# Patient Record
Sex: FEMALE | Race: BLACK OR AFRICAN AMERICAN | NOT HISPANIC OR LATINO | Employment: OTHER | RURAL
[De-identification: names, ages, dates, MRNs, and addresses within clinical notes are randomized per-mention and may not be internally consistent; named-entity substitution may affect disease eponyms.]

---

## 2017-10-06 ENCOUNTER — OFFICE VISIT (OUTPATIENT)
Dept: PODIATRY | Facility: CLINIC | Age: 77
End: 2017-10-06

## 2017-10-06 VITALS — HEIGHT: 60 IN | BODY MASS INDEX: 37.3 KG/M2 | WEIGHT: 190 LBS

## 2017-10-06 DIAGNOSIS — M79.671 RIGHT FOOT PAIN: Primary | ICD-10-CM

## 2017-10-06 DIAGNOSIS — M67.471 GANGLION CYST OF RIGHT FOOT: ICD-10-CM

## 2017-10-06 PROCEDURE — 99203 OFFICE O/P NEW LOW 30 MIN: CPT | Performed by: PODIATRIST

## 2017-10-06 RX ORDER — CHLORTHALIDONE 25 MG/1
25 TABLET ORAL DAILY
COMMUNITY
Start: 2017-09-16

## 2017-10-06 RX ORDER — LOSARTAN POTASSIUM 100 MG/1
100 TABLET ORAL DAILY
COMMUNITY
Start: 2017-09-16 | End: 2023-02-16 | Stop reason: ALTCHOICE

## 2017-10-06 RX ORDER — MELOXICAM 15 MG/1
15 TABLET ORAL DAILY
COMMUNITY
Start: 2017-09-16

## 2017-10-06 RX ORDER — LEVOTHYROXINE SODIUM 0.05 MG/1
50 TABLET ORAL DAILY
COMMUNITY
Start: 2017-09-16

## 2017-10-06 RX ORDER — AMLODIPINE BESYLATE 10 MG/1
10 TABLET ORAL DAILY
COMMUNITY
Start: 2017-09-16

## 2017-10-06 RX ORDER — POTASSIUM CHLORIDE 750 MG/1
10 TABLET, FILM COATED, EXTENDED RELEASE ORAL DAILY
COMMUNITY
Start: 2017-09-16 | End: 2023-02-16 | Stop reason: ALTCHOICE

## 2017-10-06 NOTE — PROGRESS NOTES
"Libia Lindo  1940  77 y.o. female   Patient presents today for right foot pain.    10/6/2017  Chief Complaint   Patient presents with   • Right Foot - Pain           History of Present Illness    77-year-old female presents to clinic today with chief complaint of right foot pain.  Pain is located to the top of her right foot.  States that there is a painful nodule has been there for several months.  The nodule rubs in shoe gear.  The pain comes and goes.  She describes the pain as shooting when it happens.  She treats it by not wearing shoes that rub the area.  She has done nothing else to treat it.  She denies any injuries or trauma to the right foot.  She has no other pedal complaints.        Past Medical History:   Diagnosis Date   • Hypertension    • Hyperthyroidism          Past Surgical History:   Procedure Laterality Date   • EYE SURGERY     • HYSTERECTOMY     • KNEE SURGERY           No family history on file.    No Known Allergies    Social History     Social History   • Marital status:      Spouse name: N/A   • Number of children: N/A   • Years of education: N/A     Occupational History   • Not on file.     Social History Main Topics   • Smoking status: Never Smoker   • Smokeless tobacco: Not on file   • Alcohol use No   • Drug use: No   • Sexual activity: Defer     Other Topics Concern   • Not on file     Social History Narrative   • No narrative on file         Current Outpatient Prescriptions   Medication Sig Dispense Refill   • amLODIPine (NORVASC) 10 MG tablet 10 mg Daily.     • chlorthalidone (HYGROTON) 25 MG tablet 25 mg Daily.     • levothyroxine (SYNTHROID, LEVOTHROID) 50 MCG tablet 50 mcg Daily.     • losartan (COZAAR) 100 MG tablet 100 mg Daily.     • meloxicam (MOBIC) 15 MG tablet 15 mg Daily.     • potassium chloride (K-DUR) 10 MEQ CR tablet 10 mEq Daily.       No current facility-administered medications for this visit.          OBJECTIVE    Ht 60\" (152.4 cm)  Wt 190 " lb (86.2 kg)  BMI 37.11 kg/m2      Review of Systems   Constitutional: Negative for chills and fever.   Cardiovascular: Negative for chest pain.   Gastrointestinal: Negative for constipation, diarrhea, nausea and vomiting.   Skin: Negative for wound.  nodule top of right foot  Musculoskeletal:Right foot pain      Constitutional: well developed, well nourished    HEENT: Normocephalic and atraumatic, normal hearing    Respiratory: Non labored respirations noted    Cardiovascular:    DP/PT pulses palpable    CFT brisk  to all digits  Skin temp is warm to warm from proximal tibia to distal digits  Pedal hair growth present.   No erythema or edema noted     Musculoskeletal:  Muscle strength is 5/5 for all muscle groups tested   ROM of the 1st MTP is full without pain or crepitus  ROM of the MTJ is full without pain or crepitus    ROM of the STJ is full without pain or crepitus    ROM of the ankle joint is full without pain or crepitus    Rectus foot type     Dermatological:   Nails 1-5 are within normal limits for length and thickness    Skin is warm, dry and intact    Webspaces 1-4 bilateral are clean, dry and intact.   Soft freely mobile subcutaneous mass noted to the dorsal aspect the right foot.  It is not tender to palpation.  Positive Tinel's on percussion  No open wounds noted     Neurological:   Protective sensation intact    Sensation intact to light touch    DTR intact    Psychiatric: A&O x 3 with normal mood and affect. NAD.     Radiographs: 3 views of the right foot were obtained today.  No acute osseous abnormalities noted.        Procedures        ASSESSMENT AND PLAN    Libia was seen today for pain.    Diagnoses and all orders for this visit:    Right foot pain  -     XR Foot 3+ View Right    Ganglion cyst of right foot  -     MRI Foot Right With & Without Contrast; Future    - Comprehensive foot and ankle exam performed.   - X-rays taken and reviewed  - Ordered MRI to evaluate suspected ganglionic cyst of  right foot  - .  Discussion was held patient regarding treatment options for her daily on exist  - All questions were answered and the patient is in agreement with the current treatment plan.  - RTC after MRI          This document has been electronically signed by Leander Verdin DPM on October 6, 2017 3:21 PM     10/6/2017  3:21 PM    EMR Dragon/Transcription disclaimer:   Much of this encounter note is an electronic transcription/translation of spoken language to printed text. The electronic translation of spoken language may permit erroneous, or at times, nonsensical words or phrases to be inadvertently transcribed; Although I have reviewed the note for such errors, some may still exist.

## 2017-10-18 ENCOUNTER — HOSPITAL ENCOUNTER (OUTPATIENT)
Dept: MRI IMAGING | Facility: HOSPITAL | Age: 77
Discharge: HOME | End: 2017-10-18
Attending: PODIATRIST | Admitting: PODIATRIST

## 2017-10-18 DIAGNOSIS — M67.471 GANGLION CYST OF RIGHT FOOT: ICD-10-CM

## 2017-10-18 PROCEDURE — A9576 INJ PROHANCE MULTIPACK: HCPCS | Performed by: PODIATRIST

## 2017-10-18 PROCEDURE — 73720 MRI LWR EXTREMITY W/O&W/DYE: CPT

## 2017-10-18 PROCEDURE — 25010000002 GADOTERIDOL PER 1 ML: Performed by: PODIATRIST

## 2017-10-18 RX ADMIN — GADOTERIDOL 19 ML: 279.3 INJECTION, SOLUTION INTRAVENOUS at 09:19

## 2017-11-10 ENCOUNTER — TELEPHONE (OUTPATIENT)
Dept: PODIATRY | Facility: CLINIC | Age: 77
End: 2017-11-10

## 2023-02-16 ENCOUNTER — OFFICE VISIT (OUTPATIENT)
Dept: CARDIAC SURGERY | Facility: CLINIC | Age: 83
End: 2023-02-16
Payer: MEDICARE

## 2023-02-16 VITALS
OXYGEN SATURATION: 97 % | BODY MASS INDEX: 31.38 KG/M2 | HEIGHT: 61 IN | DIASTOLIC BLOOD PRESSURE: 80 MMHG | HEART RATE: 69 BPM | SYSTOLIC BLOOD PRESSURE: 128 MMHG | TEMPERATURE: 97.5 F | WEIGHT: 166.2 LBS

## 2023-02-16 DIAGNOSIS — I65.23 BILATERAL CAROTID ARTERY STENOSIS: Primary | ICD-10-CM

## 2023-02-16 DIAGNOSIS — I10 BENIGN ESSENTIAL HTN: ICD-10-CM

## 2023-02-16 DIAGNOSIS — E66.09 CLASS 1 OBESITY DUE TO EXCESS CALORIES WITH SERIOUS COMORBIDITY AND BODY MASS INDEX (BMI) OF 31.0 TO 31.9 IN ADULT: ICD-10-CM

## 2023-02-16 DIAGNOSIS — E78.2 MIXED HYPERLIPIDEMIA: ICD-10-CM

## 2023-02-16 DIAGNOSIS — G60.9 IDIOPATHIC PERIPHERAL NEUROPATHY: ICD-10-CM

## 2023-02-16 PROCEDURE — 1159F MED LIST DOCD IN RCRD: CPT | Performed by: THORACIC SURGERY (CARDIOTHORACIC VASCULAR SURGERY)

## 2023-02-16 PROCEDURE — 99204 OFFICE O/P NEW MOD 45 MIN: CPT | Performed by: THORACIC SURGERY (CARDIOTHORACIC VASCULAR SURGERY)

## 2023-02-16 PROCEDURE — 3079F DIAST BP 80-89 MM HG: CPT | Performed by: THORACIC SURGERY (CARDIOTHORACIC VASCULAR SURGERY)

## 2023-02-16 PROCEDURE — 3074F SYST BP LT 130 MM HG: CPT | Performed by: THORACIC SURGERY (CARDIOTHORACIC VASCULAR SURGERY)

## 2023-02-16 PROCEDURE — 1160F RVW MEDS BY RX/DR IN RCRD: CPT | Performed by: THORACIC SURGERY (CARDIOTHORACIC VASCULAR SURGERY)

## 2023-02-16 RX ORDER — ATORVASTATIN CALCIUM 40 MG/1
40 TABLET, FILM COATED ORAL DAILY
COMMUNITY

## 2023-02-16 RX ORDER — GABAPENTIN 100 MG/1
200 CAPSULE ORAL
COMMUNITY

## 2023-02-21 ENCOUNTER — TRANSCRIBE ORDERS (OUTPATIENT)
Dept: SPEECH THERAPY | Facility: HOSPITAL | Age: 83
End: 2023-02-21
Payer: MEDICARE

## 2023-02-21 DIAGNOSIS — R13.12 OROPHARYNGEAL DYSPHAGIA: Primary | ICD-10-CM

## 2023-03-20 ENCOUNTER — HOSPITAL ENCOUNTER (OUTPATIENT)
Dept: GENERAL RADIOLOGY | Facility: HOSPITAL | Age: 83
Discharge: HOME | End: 2023-03-20
Admitting: NURSE PRACTITIONER
Payer: MEDICARE

## 2023-03-20 DIAGNOSIS — R13.12 OROPHARYNGEAL DYSPHAGIA: ICD-10-CM

## 2023-03-20 PROCEDURE — A9270 NON-COVERED ITEM OR SERVICE: HCPCS | Performed by: NURSE PRACTITIONER

## 2023-03-20 PROCEDURE — 74230 X-RAY XM SWLNG FUNCJ C+: CPT

## 2023-03-20 PROCEDURE — 63710000001 BARIUM SULFATE 96 % RECONSTITUTED SUSPENSION: Performed by: NURSE PRACTITIONER

## 2023-03-20 PROCEDURE — 92611 MOTION FLUOROSCOPY/SWALLOW: CPT | Performed by: SPEECH-LANGUAGE PATHOLOGIST

## 2023-03-20 RX ADMIN — BARIUM SULFATE 25 ML: 960 POWDER, FOR SUSPENSION ORAL at 09:33

## 2023-03-20 NOTE — THERAPY EVALUATION
Speech Language Pathology   MBS FEES / Discharge Summary  [facility]       Patient Name: Libia Lindo  : 1940  MRN: [id number]    Today's Date: 3/20/2023      Visit Date: 2023     Visit Dx:     ICD-10-CM ICD-9-CM   1. Oropharyngeal dysphagia  R13.12 787.22       There is no problem list on file for this patient.       Past Medical History:   Diagnosis Date   • Hypertension    • Hyperthyroidism         Past Surgical History:   Procedure Laterality Date   • EYE SURGERY     • HYSTERECTOMY     • KNEE SURGERY                    OP SLP Assessment/Plan - 23 0910        SLP Assessment    Functional Problems Speech Language- Adult/Cognition;Swallowing  -EK    Impact on Function: Swallowing Risk of malnourishment  -EK    Clinical Impression: Swallowing Mild:;oral phase dysphagia  -EK    Functional Problems Comment globus  -EK    Clinical Impression Comments Pt and sister provided history and SLP educated both at end of MBS. Pt displays oral phase deficits to include decreased oral transit time, oral holding on liquids however pharyngeal phase of the swallow WFL. No residue noted in th pharynx, no penetration or aspiration noted.  -EK    Prognosis Good (comment)  -EK    Patient/caregiver participated in establishment of treatment plan and goals No  -EK       SLP Plan    Frequency Evaluation only  -EK          User Key  (r) = Recorded By, (t) = Taken By, (c) = Cosigned By    Initials Name Provider Type    EK Fany Cha CCC-SLP Speech and Language Pathologist                 SLP Adult Swallow Evaluation     Row Name 2310       Rehab Evaluation    Document Type evaluation  -EK    Patient Observations alert;cooperative;agree to therapy  -EK    Patient/Family/Caregiver Comments/Observations sister present  -EK    Patient Effort good  -EK       General Information    Patient Profile Reviewed yes  -EK    Pertinent History Of Current Problem Pt reports a lump in her  throat. Pt's voice is very dysphonia with hoarseness. Pt reports her voice comes and goes. Hoarseness and swallowing has been occuring for about one month. Pt has had a 20 pound weight loss. Sister does report that pt has thyroid nodules. Pt is seeing ENT tomorrow.  -EK    Current Method of Nutrition regular textures;thin liquids  -EK    Precautions/Limitations, Vision WFL with corrective lenses  -EK    Precautions/Limitations, Hearing WFL  -EK    Prior Level of Function-Communication WFL  -EK    Prior Level of Function-Swallowing no diet consistency restrictions  -EK    Plans/Goals Discussed with patient;family  sister  -EK       Pain    Additional Documentation Pain Scale: Numbers Pre/Post-Treatment (Group)  -EK       Pain Scale: Numbers Pre/Post-Treatment    Pretreatment Pain Rating 0/10 - no pain  -EK    Posttreatment Pain Rating 0/10 - no pain  -EK       Oral Motor Structure and Function    Dentition Assessment upper dentures/partial in place;lower dentures/partial in place  -EK    Secretion Management WNL/WFL  -EK    Volitional Swallow WFL  -EK    Volitional Cough WFL  -EK       MBS/VFSS    Utensils Used cup;spoon  -EK       MBS/VFSS Interpretation    Oral Prep Phase impaired oral phase of swallowing  -EK    Oral Transit Phase impaired  -EK    Oral Residue WFL  -EK       Oral Preparatory Phase    Oral Preparatory Phase prolonged manipulation  -EK       Oral Transit Phase    Impaired Oral Transit Phase increased A-P transit time;premature spillage of liquids into pharynx  -EK    Increased A-P Transit Time thin liquids;pudding/puree  -EK    Premature Spillage of Liquids into Pharynx thin liquids  -EK       Initiation of Pharyngeal Swallow    Pharyngeal Phase functional pharyngeal phase of swallowing  -EK       Esophageal Phase    Esophageal Phase --  unremarkable for viewing area  -EK       SLP Communication to Radiology    Severity Level of Dysphagia mild dysphagia;oral dysfunction  -EK    Consistencies  Aspirated/Penetrated --  No penetration or aspiration  -EK    Summary Statement MBS completed due to pt reporting globus for about one month. Pt also displays dysphonia to include hoarsenes and episodes of no voice.  -EK    Summary Statement Continued Pt and sister provided history and SLP educated both at end of MBS. Pt displays oral phase deficits to include decreased oral transit time, oral holding on liquids however pharyngeal phase of the swallow WFL. No residue noted in th pharynx, no penetration or aspiration noted.  -EK       SLP Evaluation Clinical Impression    SLP Swallowing Diagnosis mild;oral dysphagia;functional pharyngeal phase  -EK       SLP Treatment Clinical Impressions    Care Plan Review evaluation/treatment results reviewed  -EK       Recommendations    Therapy Frequency (Swallow) evaluation only  -EK    SLP Diet Recommendation regular textures;thin liquids  -EK    Recommended Precautions and Strategies upright posture during/after eating;small bites of food and sips of liquid;alternate between small bites of food and sips of liquid  -EK    Oral Care Recommendations Oral Care BID/PRN  -EK    SLP Rec. for Method of Medication Administration meds whole;with thin liquids;as tolerated  -EK    Monitor for Signs of Aspiration yes  -EK    Demonstrates Need for Referral to Another Service otolaryngology (ENT);gastroenterology  Pt may benefit from esophagram to rule out esophageal phase deficits. ENT schedule for tomorrow.  -EK          User Key  (r) = Recorded By, (t) = Taken By, (c) = Cosigned By    Initials Name Provider Type    Fany Delgado CCC-SLP Speech and Language Pathologist                                OP SLP Education     Row Name 03/20/23 2453       Education    Barriers to Learning No barriers identified  -EK    Education Provided Described results of evaluation  -EK    Assessed Learning motivation  -EK    Learning Motivation Strong  -EK    Learning Method  Explanation;Demonstration  -EK    Teaching Response Verbalized understanding;Demonstrated understanding  -EK    Education Comments SLP educated pt and sister on the results of MBS. SLP showed pt/family the images after the MBS to discuss oral phase defictis. Sister demonstrated understanding.  -EK          User Key  (r) = Recorded By, (t) = Taken By, (c) = Cosigned By    Initials Name Effective Dates    Fany Delgado CCC-SLP 06/16/21 -                                    Time Calculation:        Therapy Charges for Today     Code Description Service Date Service Provider Modifiers Qty    [id number] HC ST MOTION FLUORO EVAL SWALLOW 3 3/20/2023 Fany Cha CCC-SLP GN 1                  ANNITA Foote  3/20/2023

## 2023-03-28 PROBLEM — E66.09 CLASS 1 OBESITY DUE TO EXCESS CALORIES WITH SERIOUS COMORBIDITY AND BODY MASS INDEX (BMI) OF 31.0 TO 31.9 IN ADULT: Status: ACTIVE | Noted: 2023-03-28

## 2023-03-28 PROBLEM — E78.2 MIXED HYPERLIPIDEMIA: Status: ACTIVE | Noted: 2023-03-28

## 2023-03-28 PROBLEM — G60.9 IDIOPATHIC PERIPHERAL NEUROPATHY: Status: ACTIVE | Noted: 2023-03-28

## 2023-03-28 PROBLEM — I65.23 BILATERAL CAROTID ARTERY STENOSIS: Status: ACTIVE | Noted: 2023-03-28

## 2023-03-28 PROBLEM — I10 BENIGN ESSENTIAL HTN: Status: ACTIVE | Noted: 2023-03-28

## 2023-03-28 NOTE — PROGRESS NOTES
2/16/2023  Libia Lindo  1940    Chief Complaint:    Chief Complaint   Patient presents with   • pulsatile neck mass       HPI:      PCP:  Mahogany Rowe APRN    82 y.o. female with HTN(stable, increased risk stroke, rupture), Hyperlipidemia(stable, increased risk cardiovascular events) and Obesity(uncontrolled, increased risk cardiovascular events) , peripheral neuropathy(stable), carotid stenosis(new, increase risk stroke).  never smoked.  Asymptomatic carotid stenosis.   Evaluation 1/2023 [facility] neck mass, speech issues, something stuck in throat. No TIA stroke amaurosis.  No MI claudication. No other associated signs, symptoms or modifying factors.    1/2023 CTA Neck ([facility]):  RIGHT prominent tortuous innominate artery, subclavian 50% kink, tortuous.  No significant carotid stenosis or aneurysm.    1/2023 ECG:  SB 57, QTc 453.  Inferior infarct.    The following portions of the patient's history were reviewed and updated as appropriate: allergies, current medications, past family history, past medical history, past social history, past surgical history and problem list.  Recent images independently reviewed.  Available laboratory values reviewed.    PMH:  Past Medical History:   Diagnosis Date   • Hypertension    • Hyperthyroidism      Past Surgical History:   Procedure Laterality Date   • EYE SURGERY     • HYSTERECTOMY     • KNEE SURGERY       No family history on file.  Social History     Tobacco Use   • Smoking status: Never     Passive exposure: Never   • Smokeless tobacco: Never   Substance Use Topics   • Alcohol use: No   • Drug use: No       ALLERGIES:  No Known Allergies      MEDICATIONS:    Current Outpatient Medications:   •  amLODIPine (NORVASC) 10 MG tablet, 10 mg Daily., Disp: , Rfl:   •  atorvastatin (LIPITOR) 40 MG tablet, Take 40 mg by mouth Daily., Disp: , Rfl:   •  chlorthalidone (HYGROTON) 25 MG tablet, 25 mg Daily., Disp: , Rfl:   •  gabapentin (NEURONTIN) 100 MG capsule, Take  "200 mg by mouth every night at bedtime., Disp: , Rfl:   •  levothyroxine (SYNTHROID, LEVOTHROID) 50 MCG tablet, 50 mcg Daily., Disp: , Rfl:   •  linaclotide (LINZESS) 290 MCG capsule capsule, Take 290 mcg by mouth Every Morning Before Breakfast., Disp: , Rfl:   •  meloxicam (MOBIC) 15 MG tablet, 15 mg Daily., Disp: , Rfl:   •  vitamin D3 125 MCG (5000 UT) capsule capsule, Take 5,000 Units by mouth Daily., Disp: , Rfl:     Review of Systems   Review of Systems   Constitutional: Negative for malaise/fatigue and weight loss.   HENT:        Something stuck in throat  Speech issues  Pulsatile neck mass   Cardiovascular: Negative for chest pain, claudication and dyspnea on exertion.   Respiratory: Negative for cough and shortness of breath.    Skin: Negative for color change and poor wound healing.   Musculoskeletal: Positive for arthritis and joint pain.   Neurological: Negative for dizziness, numbness and weakness.       Physical Exam   Vitals:    02/16/23 1314 02/16/23 1315   BP: 124/76 128/80   BP Location: Left arm Right arm   Pulse: 69    Temp: 97.5 °F (36.4 °C)    TempSrc: Infrared    SpO2: 97%    Weight: 75.4 kg (166 lb 3.2 oz)    Height: 154.9 cm (61\")      Body surface area is 1.75 meters squared.  Body mass index is 31.4 kg/m².  Physical Exam  Constitutional:       General: She is not in acute distress.     Appearance: She is not ill-appearing.   HENT:      Right Ear: Hearing normal.      Left Ear: Hearing normal.      Nose: No nasal deformity.      Mouth/Throat:      Dentition: Normal dentition. Does not have dentures.   Cardiovascular:      Rate and Rhythm: Normal rate and regular rhythm.      Pulses:           Carotid pulses are 2+ on the right side and 2+ on the left side.       Radial pulses are 2+ on the right side and 2+ on the left side.        Dorsalis pedis pulses are 2+ on the right side and 2+ on the left side.        Posterior tibial pulses are 2+ on the right side and 2+ on the left side.      " Heart sounds: No murmur heard.     Comments: Pulsatile RIGHT lower neck mass is prominent tortuous innominate artery  Pulmonary:      Effort: Pulmonary effort is normal.      Breath sounds: Normal breath sounds.   Abdominal:      General: There is no distension.      Palpations: Abdomen is soft. There is no mass.      Tenderness: There is no abdominal tenderness.   Musculoskeletal:         General: No deformity.      Comments: Gait normal.    Skin:     General: Skin is warm and dry.      Coloration: Skin is not pale.      Findings: No erythema.      Comments: No venous staining   Neurological:      Mental Status: She is alert and oriented to person, place, and time.   Psychiatric:         Speech: Speech normal.         Behavior: Behavior is cooperative.         Thought Content: Thought content normal.         Judgment: Judgment normal.       1/2023 BUN 15, Creat 0.8, GFR 69    ASSESSMENT:  Diagnoses and all orders for this visit:    1. Bilateral carotid artery stenosis (Primary)  -     Duplex Carotid Ultrasound CAR; Future    2. Mixed hyperlipidemia    3. Benign essential HTN    4. Idiopathic peripheral neuropathy    5. Class 1 obesity due to excess calories with serious comorbidity and body mass index (BMI) of 31.0 to 31.9 in adult      PLAN:  Detailed discussion with Libia Lindo regarding situation and options.  Mild carotid stenosis.  Pulsatile RIGHT neck mass is prominent tortuous inomminate artery, no aneurysm.  Multiple risk factors with severe comorbidities.  No intervention indicated at this time.  Will follow with interval imaging.  Risks, benefits discussed.  Understands and wishes to proceed with plan.    Return in 1 year with carotid duplex    Return after above studies complete  Obesity Class  1. Increased risk cardiovascular events, sleep and breathing disorders, joint issues, type 2 diabetes mellitus. Options for weight management, heart healthy diet, exercise programs, and associated  health risks of obesity discussed.    Recommended regular physical activity, progressive walking program.  Continue current medications as directed.  Will Obtain relevant old records.  Advance Care Planning   ACP discussion was declined by the patient. Patient does not have an advance directive, declines further assistance.     Thank you for the opportunity to participate in this patient's care.    Copy to primary care provider.